# Patient Record
Sex: MALE | Race: BLACK OR AFRICAN AMERICAN | NOT HISPANIC OR LATINO | URBAN - METROPOLITAN AREA
[De-identification: names, ages, dates, MRNs, and addresses within clinical notes are randomized per-mention and may not be internally consistent; named-entity substitution may affect disease eponyms.]

---

## 2018-12-31 ENCOUNTER — EMERGENCY (EMERGENCY)
Facility: HOSPITAL | Age: 43
LOS: 0 days | Discharge: LEFT BEFORE DISCHARGE | End: 2019-01-01
Admitting: PHYSICIAN ASSISTANT

## 2018-12-31 VITALS
OXYGEN SATURATION: 98 % | SYSTOLIC BLOOD PRESSURE: 126 MMHG | DIASTOLIC BLOOD PRESSURE: 65 MMHG | RESPIRATION RATE: 19 BRPM | TEMPERATURE: 97 F | HEART RATE: 87 BPM | WEIGHT: 199.96 LBS

## 2018-12-31 DIAGNOSIS — Z03.89 ENCOUNTER FOR OBSERVATION FOR OTHER SUSPECTED DISEASES AND CONDITIONS RULED OUT: ICD-10-CM

## 2018-12-31 NOTE — ED ADULT TRIAGE NOTE - CHIEF COMPLAINT QUOTE
pt received from waiting room stating "I need more oxygen my tank is empty." Pt offers no complaints and states that his family is bringing him another portable O2 tank tonight, however he needs to be on oxygen till then.

## 2019-01-01 NOTE — ED ADULT NURSE NOTE - OBJECTIVE STATEMENT
pt came to ED reporting that he ran out of his portable O2 at home. pt continuously wears O2 at home. denies pain, denies SOB

## 2024-12-07 NOTE — ED ADULT NURSE NOTE - TEMPLATE LIST FOR HEAD TO TOE ASSESSMENT
Trauma Progress Note      Patient:  Chalo River         MRN: [id number]  Age: 70 year old  Sex: male     : 1954  Date: 24  Author: Zach Antony ANP-C CCRN TCRN TNS    SUBJECTIVE:   CHIEF COMPLAINT:  RUE +swelling, compartments tight but compressible, able to wiggle fingers, +sensation, distal pulses palpable.    P: RUE venous doppler ordered; encourage to elevate RUE with pillow.  Continue PT/OT   Dispo: . Per CM, referral sent to [facility]       ALLERGIES:  ALLERGIES:  No Known Allergies    MEDICATIONS:  Current Facility-Administered Medications   Medication    insulin glargine (LANTUS) injection 8 Units    carvedilol (COREG) tablet 6.25 mg    docusate sodium-sennosides (SENOKOT S) 50-8.6 MG 1 tablet    aspirin (ECOTRIN) enteric coated tablet 81 mg    hydrALAZINE (APRESOLINE) injection 5 mg    insulin lispro (ADMELOG,HumaLOG) - Correction Dose    acetaminophen (TYLENOL) tablet 650 mg    Or    acetaminophen (TYLENOL) suppository 650 mg    polyethylene glycol (MIRALAX) packet 17 g    magnesium hydroxide (MILK OF MAGNESIA) 400 MG/5ML suspension 30 mL    bisacodyl (DULCOLAX) suppository 10 mg    heparin (porcine) injection 5,000 Units    dextrose 50 % injection 25 g    dextrose 50 % injection 12.5 g    glucagon (GLUCAGEN) injection 1 mg    dextrose (GLUTOSE) 40 % gel 15 g    dextrose (GLUTOSE) 40 % gel 30 g    insulin lispro (ADMELOG,HumaLOG) - Correction Dose    sodium chloride 0.9 % injection 10 mL    sodium chloride 0.9 % injection 2 mL    oxyCODONE (IMM REL) (ROXICODONE) tablet 5 mg    ondansetron (ZOFRAN ODT) disintegrating tablet 4 mg    Or    ondansetron (ZOFRAN) injection 4 mg    amLODIPine (NORVASC) tablet 10 mg    buPROPion XL (WELLBUTRIN XL) tablet 150 mg    modafinil (PROVIGIL) tablet 200 mg    Brexpiprazole Tab 1 mg    NON FORMULARY    DULoxetine (CYMBALTA) capsule 60 mg    atorvastatin (LIPITOR) tablet 10 mg       Review of Systems    OBJECTIVE:     Physical Exam  Constitutional:        Appearance: He is overweight. He is not ill-appearing.   HENT:      Head: Normocephalic.      Mouth/Throat:      Mouth: Mucous membranes are moist.   Eyes:      Pupils: Pupils are equal, round, and reactive to light.   Cardiovascular:      Pulses: Normal pulses.   Pulmonary:      Effort: Pulmonary effort is normal.      Breath sounds: Normal breath sounds.   Abdominal:      General: Abdomen is soft. Bowel sounds are normal. NT, ND.  Musculoskeletal:      Cervical back: Normal range of motion.      Comments: Compartments tight but compressible on right arm in sling. Able to wiggle fingers, right hand. Strength intact remaining extremities.  Distal pulses palpable  Skin:     General: Skin is warm and dry.      Capillary Refill: Capillary refill takes less than 2 seconds.   Neurological:      General: No focal deficit present.      Mental Status: He is alert.      Sensory: Sensation is intact.      Motor: Motor function is intact.      Comments: Oriented x2, person, place. Parkinsonian tremor to extremities   Psychiatric:         Behavior: Behavior is cooperative.   Visit Vitals  BP (!) 144/73 (BP Location: LUE - Left upper extremity, Patient Position: Semi-Tarango's)   Pulse 66   Temp 98.1 °F (36.7 °C) (Oral)   Resp 17   Ht 5' 7\" (1.702 m)   Wt 98.1 kg (216 lb 4.3 oz)   SpO2 97%   BMI 33.86 kg/m²       Intake/Output Summary (Last 24 hours) at 12/7/2024 0801  Last data filed at 12/7/2024 0520  Gross per 24 hour   Intake 420 ml   Output 3600 ml   Net -3180 ml       DIAGNOSTIC STUDIES:   LAB/RADIOLOGY RESULTS:    Recent Labs   Lab 12/07/24  0517 12/06/24  0935 12/05/24  0530   WBC 8.4 8.1 7.2   RBC 3.92* 3.57* 3.59*   HGB 10.2* 9.3* 9.3*   HCT 33.2* 30.3* 30.2*   * 392 383     Recent Labs   Lab 12/07/24  0517 12/06/24  0935 12/05/24  0530   SODIUM 137 136 136   POTASSIUM 4.6 4.8 4.6   CHLORIDE 105 104 106   CO2 26 25 25   BUN 33* 37* 40*   CREATININE 1.10 1.28* 1.30*   GLUCOSE 145* 212* 197*   CALCIUM 8.9 8.6 8.7      No results found     No results found.   ASSESSMENT AND PLAN:   71 y/o M w/ PMHx of Parkinson's Disease, HTN, HLD, DM, and ?afib/flutter who presents to the ED as a trauma transfer from OSH. Early this AM patient reportedly fell down approximately 13 steps at home and was brought to OSH ED for evaluation. Evaluation revealed a comminuted and displaced R proximal humeral fx, a R scapular fx, and a 16.5 cm hematoma overlying the R gluteal musculature with evidence of active arterial bleeding. Additional fractures of T2, T3 superior endplate, T12 superior endplate, and L1 superior endplate were noted but were of indeterminate acuity. Patient was subsequently transferred to [facility] ED for further management. GCS 15 on arrival. ETOH negative, Utox + opiates     Injuries:  Mechanical Fall down stairs  R gluteal hematoma  R proximal humerus fx-Comminuted and displaced   R scapular fx involving the glenoid and base of the coracoid process   Age indeterminate compression fractures of T2, T3, T9, T12 and L1   Mild T2 and T3 superior endplate compression fracture deformity. Mild T9 superior endplate compression fracture deformity. Mild T12 superior endplate compression fracture deformity. Mild L1 superior endplate compression fracture deformity.  Acute microfractures involving the T1, T2 and T3 vertebral bodies without significant loss of vertebral body height.  Compression deformities at the L1 and L3 levels w/ Persistent marrow edema indicates incompletely healed fractures or possibly acute upon chronic fractures  Hematoma over the right gluteal musculature measuring axial dimensions of 16.5 cm x 7.4 cm  Nondisplaced lateral tibial plateau fracture of indeterminate age      Acute kidney injury on CKD 3  Acute hypoxic respiratory failure-resolved  Acute blood loss anemia   -ARF-resolved  -Hyperphosphatemia   -Leukocytosis - resolved  BMI 33  - Chronic white matter ischemic demyelination within the cerebral hemispheres  General bilaterally  -tiny old subcortical lacunar infarcts within the anterior right thalamus  -Hemosiderin is present within sulci of the mid and posterior medial left frontal lobes, consistent with old hemorrhage  -Few tiny punctate microhemorrhages within the cortical and juxtacortical regions of the cerebral hemispheres     PMHx  - Afib (on 81mg ASA), HTN, HLD, T2DM, depression, Parkinson's disease, HARSHA (on CPAP) CKD III     Incidental Findings:  11/25 CTCAP-Probable right renal cyst . Mildly enlarged prostate gland ;few nonspecific calcifications along the anterior aspect of the right hepatic lobe.  Left diaphragmatic hernia or left hemidiaphragm elevation. Decreased fluid collection along right heart boarder suspected represents an epicardia/pericardia cyst.  11/29 Discussed above incidental findings with patient.  Agreeable to route report to PCP Shahriar Claudio MD for follow up after discharge.      Consults:  Hospitalist-[facility]  Neurosurgery-[facility]  Nephrology-Pro  PM&R     Procedures:   11/26 Ccollar cleared     A/P  Neuro  MMPC Pain control Tylenol 650 q6, Oxy 5q6 prn  11/26 Ccollar cleared  Per NSGY No brace recommended at this time given patient's no complaints of tenderness on palpation or back pain of the thoracic or lumbar spine             - May benefit from corset/TLSO bracing  should he develop symptoms in the future;  Patient to follow up in approx 2 weeks with repeat upright XR T/L spine   ok for chemical ppx   11/30 MRI brain per medicine - no acute findings-No acute intracranial abnormality and no interval change.   12/1 MRI B reviewed by hospitalist, resumed  ASA 81mg     SLP   Cog eval recs pending  11/27 ST- Therapy needs at discharge: does not require ongoing therapy ; IST- neg     Psych  Brexpiprazole, Wellbutrin  Cymbalta on hold     CV  Admission EKG; Sinus rhythm with 1st degree AV block   Right superior axis deviation Septal infarct, Inferior infarct, age  undetermined   Trop negative   Hydralazine prn  Norvasc 10 QD, Lipitor, 11/26 Provigil resumed  12/5 Carvedilol  12/7 HDS     Resp  BiPAP/CPAP  Enc IS   12/7 97% room air     GI  Diet:Consistent carb moderate  Bowel regimen Senokot, Miralax; Dulc supp prn; MOM prn  11/27 SLP-  *Liquid- Thin and IDDSI 7 Easy-to-Chew   Zofran prn  Last bm 12/6       Admission UA; SG > 1.030, Glucose> 1000, +protein, small blood- will repeat UA St cath  CK 54  11/27 Per renal-ARF - CKD stage 3a followed by [facility] Nephrologist Dr. Sorenson. Concerned for ATN, currently hemodynamically stable but BP dropped transiently on 11/25. Admitted after traumatic fall, CK level not elevated. Imaging shows no evidence of obstruction. Urine studies suggest volume avidity and improvement noted on IVF.  DM type 2 CKD - Glucosuria due to SGLT2i use, agree with holding Farxiga while inpatient.   Hyperphosphatemia - Monitor for need to add binders  HTN CKD - Monitor on amlodipine  11/28 External catheter  12/2 Per renal-. ARF - CKD stage 3a followed by [facility] Nephrologist Dr. Sorenson. Baseline Cr ~1.2-1.5, up to 3.5 11/26 -> improved with IVF. Concerned for ATN, currently hemodynamically stable but BP dropped transiently on 11/25. Admitted after traumatic fall, CK level not elevated. Imaging shows no evidence of obstruction. Urine studies suggest volume avidity and improvement noted on IVF.  - continue to monitor, Cre at baseline now  - stopped IVF 11/28p  DM type 2 CKD - Glucosuria due to SGLT2i use, agree with holding Farxiga while inpatient.   Hyperphosphatemia - resolved  HTN CKD - Monitor on amlodipine     Met/FEN  replete lytes prn  12/07 Cr at baseline      Endo  ISS +correction dose/Lantus     MSK  Activity as tolerated NWB RUE in sling  11/26 Per ortho-- Activity: As tolerated  - Weight-bearing: NWB RUE in sling  - PT/OT   - VTE ppx: Per primary team, does not need to be held from an orthopaedic surgery perspective  - PPx: SCDs, IS   - Pain  management: multimodal pain control   - Remainder of management per primary service  - Follow up outpatient in clinic with Dr. Monroy in 1 week   PT/OT; NWB RUE in sling  11/26 OT-Therapy needs at discharge: therapy 5 or more times per week ; IOT-OT Frequency: 3-5 x per week   PT-Therapy needs at discharge: therapy 5 or more times per week; IPT-PT Frequency: 3-5 x per week    12/6 PT-Therapy needs at discharge: therapy 5 or more times per week ; IPT-PT Frequency: 3-5 x per week     ID  Afebrile, no leukocytosis     Heme  Hgb stable     PPX  RAP >8  SCD's  11/26 AK venous doppler BLE-neg>12/3 ordered>exam completed 12/4, exam negative.  12/7 RUE venous doppler ordered for rt arm swelling  DVT PPX Heparin subcutaneous 5000 TID , ASA 81 QD     Dispo   Prior to admission, lives w/ sps in a townhouse, PCP Dr Lund, Shahriar   11/26 IVF's, Nephrology consult, c-collar cleared, PT/OT, SLP recs pending  11/27-PT/OT recs: 5x week, Ctn IVFs, holding heparin for decreasing hgb 11.2>9.2, H&H 1800.   Per SW-Pt prefers tranfer to [facility] where he was recently at Jan '24. SNF list provided as back up Family prefers referral to [facility] as back up.   11/28 continue PT/OT; resume Heparin ppx. Dispo planning.  11/29 SW/CM assisting with dispo planning, pending SNF acceptance.  11/30 F/U MRI brain per medicine.   12/1 Clear for discharge. SW/CM updates sent to SNF, referrals remain pending.   12/2 Per SW, pt and family aware pt will need rehab and prefers 1st choice as [facility] and 2nd choice [facility]. Accepted [facility]; ins authorization pending.  Per medicine-Ok to discharge from medical standpoint once rehab available   12/3 screening AK venous doppler ordered,[facility] insurance auth still pending.  12/4-SW recs: [facility] accepted, pt remains pending insurance approval.  12/5-SW recs:[facility] requesting peer to peer. 6W consulted.   12/06 Per PM&R-Continue to recommend course of acute inpatient  rehabilitation. Patient is motivated and has good family support. Peer to peer scheduled with Dr. Mcfarland, patient was denied admission to [facility] by insurance. Per , referral sent to [facility] and they are reviewing and will submit to insurance. Duplex negative. Patient working with therapies.   12/7 Continue PT/OT; RUE venous doppler ordered for rt arm swelling    Zach Antony ANP-C CCRN TCRN TNS  Trauma APRN/Adult NP  Contact via [employer]>Trauma Surgery [facility]     A/P Discussed w/ Trauma attending /Dr Milian